# Patient Record
Sex: FEMALE | Race: OTHER
[De-identification: names, ages, dates, MRNs, and addresses within clinical notes are randomized per-mention and may not be internally consistent; named-entity substitution may affect disease eponyms.]

---

## 2017-12-20 ENCOUNTER — HOSPITAL ENCOUNTER (OUTPATIENT)
Dept: HOSPITAL 74 - FMAMMOTONE | Age: 67
Discharge: HOME | End: 2017-12-20
Attending: SURGERY
Payer: COMMERCIAL

## 2017-12-20 DIAGNOSIS — D05.12: Primary | ICD-10-CM

## 2017-12-20 DIAGNOSIS — N64.89: ICD-10-CM

## 2017-12-20 DIAGNOSIS — N64.1: ICD-10-CM

## 2017-12-20 PROCEDURE — 0HBU3ZX EXCISION OF LEFT BREAST, PERCUTANEOUS APPROACH, DIAGNOSTIC: ICD-10-PCS | Performed by: SURGERY

## 2017-12-21 NOTE — PATH
Surgical Pathology Report



Patient Name:  SIGIFREDO VOSS

Accession #:  [id number]

Med. Rec. #:  [id number]                                                        

   /Age/Gender:  1950 (Age: 67) / F

Account:  [id number]                                                          

             Location: [facility]

Taken:  2017

Received:  2017

Reported:  2017

Physicians:  Milady Santoro M.D.

  



Specimen(s) Received

A: LEFT BREAST SPECIMEN WITH CALCIFICATIONS 

B: LEFT BREAST SPECIMEN WITHOUT CALCIFICATIONS 





Clinical History

Mammographic findings: Microcalcification, suspicious







Final Diagnosis

A. BREAST, LEFT, WITH CALCIFICATIONS, STEREOTACTIC BIOPSY: 

DUCTAL CARCINOMA IN SITU (DCIS), SOLID AND CRIBRIFORM TYPE, INTERMEDIATE NUCLEAR

GRADE WITH MODERATE NECROSIS AND ASSOCIATED CALCIFICATIONS.



B. BREAST, LEFT, WITHOUT CALCIFICATIONS, STITCH AT BIOPSY:

FOCAL DCIS, INTERMEDIATE NUCLEAR GRADE WITH ASSOCIATED NECROSIS.



Results of ER and HI studies will be reported separately in addendum. 





***Electronically Signed***

Ana Downing M.D.



Addendum     

Reported: 2017



Addendum Diagnosis

Results of ER and HI studies performed on block A at [facility] are as follows:

ER (clone [id number] mouse monoclonal antibody by [employer]): 100 % nuclear staining with

strong intensity (Positive).

HI (clone16 mouse monoclonal antibody by [employer]): ~20 % nuclear staining with

moderate to weak intensity (Positive).



Positive and negative controls (internal if applicable) show appropriate

results.

Formalin fixation and cold ischemic times are within current ASCO/CAP

recommendations for ER, HI and Her2 testing.





 Ana Downing M.D.  

 



Gross Description

A. Received in formalin labeled "left breast with calcification," are 4

tan-yellow, cylindrical portions of fibroadipose tissue ranging from 2.1-2.5 cm

in length and averaging 0.3 cm in diameter. The specimens are submitted in toto

in one cassette.



B. Received in formalin labeled "left breast without calcifications," are 4

tan-yellow, cylindrical portions of fibroadipose tissue ranging from 1.2-2.2 cm

in length and averaging 0.2 cm in diameter. The specimens are submitted in toto

in one cassette.

Time to formalin fixation: 5 minutes

Total formalin fixation time: approximately 8 hours.

## 2017-12-21 NOTE — SPEC
DATE OF OPERATION:  12/20/2017 

 

PREOPERATIVE DIAGNOSIS:  Abnormal left mammography.

 

POSTOPERATIVE DIAGNOSIS:  Abnormal left mammography.

 

PROCEDURE PERFORMED:  Left breast stereotactic needle biopsy with clip.

 

SURGEON:  Milady Xiao MD

 

ANESTHESIA:  Local.

 

COMPLICATIONS:  None.

 

DISPOSITION:  Stable at the end of the procedure. 

 

INDICATIONS FOR PROCEDURE:  The patient underwent a routine screening mammography

that noted a cluster of microcalcifications in the left subareolar breast.  My

recommendation was for a needle biopsy.  The procedure was discussed with her and all

of her questions answered. 

 

DESCRIPTION OF PROCEDURE:  The patient was brought to [facility]

at [place] and laid prone on the OR table.  Using a lateral approach, the

calcifications in the subareolar left breast were identified.  A sterile prep was

obtained.  A target was chosen.  There was a positive stroke margin.  Using Betadine

and 1% lidocaine, a [employer] device was used to take several cores from this area. 

Specimen radiograph showed the cores with calcifications.  A clip was applied on the

area.  Hemostasis was assured with direct pressure.  Steri-Strips were used to close

the incision. 

 

She tolerated the procedure well and left the breast imaging center in good

condition. 

 

 

MILADY XIAO M.D.

 

[id number]

DD: 12/20/2017 10:13

DT: 12/21/2017 10:26

Job #:  [id number]

## 2018-02-05 ENCOUNTER — HOSPITAL ENCOUNTER (OUTPATIENT)
Dept: HOSPITAL 74 - JASUSAT | Age: 68
Discharge: HOME | End: 2018-02-05
Attending: SURGERY
Payer: COMMERCIAL

## 2018-02-05 VITALS — DIASTOLIC BLOOD PRESSURE: 70 MMHG | HEART RATE: 71 BPM | SYSTOLIC BLOOD PRESSURE: 122 MMHG

## 2018-02-05 VITALS — TEMPERATURE: 97.8 F

## 2018-02-05 VITALS — BODY MASS INDEX: 24.7 KG/M2

## 2018-02-05 DIAGNOSIS — D05.12: Primary | ICD-10-CM

## 2018-02-05 PROCEDURE — 0HBU0ZZ EXCISION OF LEFT BREAST, OPEN APPROACH: ICD-10-PCS | Performed by: SURGERY

## 2018-02-05 NOTE — OP
DATE OF OPERATION:  02/05/2018

 

PREOPERATIVE DIAGNOSIS:  Left breast ductal carcinoma in situ.

 

POSTOPERATIVE DIAGNOSIS:  Left breast ductal carcinoma in situ.

 

PROCEDURE:  Left breast wide localized lumpectomy.

 

SURGEON:  Milady Santoro MD

 

ANESTHESIA:  Local and IV sedation.

 

ESTIMATED BLOOD LOSS:  Minimal.

 

COMPLICATIONS:  None.

 

This was a sterile procedure.

 

INDICATION FOR PROCEDURE:  Patient had a needle biopsy that revealed DCIS.  My

recommendation was a lumpectomy.  The procedure was discussed and all of her

questions answered.

 

PROCEDURE IN DETAIL:  Patient was brought to [facility] in [place]

and taken down to Breast Imaging where a wire was used to localize a clip in the

upper, slightly retroareolar left breast.

 

She was then brought into the operating room, and after IV sedation and IV

antibiotics, the left breast was prepped and draped in usual sterile fashion.  The

area in the upper and retroareolar left breast was anesthetized with 1% lidocaine.  A

periareolar incision was made in the upper part of the left breast, and a wire was

used as a guide to get down to the area of interest.  This was excised en bloc,

tagged with a long stitch lateral, short stitch superior.  I felt that I was close

anteriorly which was the tissue behind the nipple.  Therefore, I took a new anterior

margin with a stitch at the old margin.  This was sent to Pathology for permanent

section.  Hemostasis was assured with electrocautery.  The parenchyma approximated

with interrupted 2-0 Vicryl.  Skin approximated with interrupted 3-0 Vicryl and

running 4-0 Prolene.  A sterile dressing with Tegaderm and 4 x 4's applied.  Specimen

radiograph showed the clip and wire to be intact within the specimen.  This was sent

to Pathology for permanent section.

 

 

BRUCE [id number]

DD: 02/05/2018 12:35

DT: 02/05/2018 21:41

Job #:  [id number]

## 2018-02-07 ENCOUNTER — HOSPITAL ENCOUNTER (EMERGENCY)
Dept: HOSPITAL 74 - JER | Age: 68
Discharge: HOME | End: 2018-02-07
Payer: COMMERCIAL

## 2018-02-07 VITALS — DIASTOLIC BLOOD PRESSURE: 85 MMHG | HEART RATE: 73 BPM | TEMPERATURE: 98.3 F | SYSTOLIC BLOOD PRESSURE: 155 MMHG

## 2018-02-07 VITALS — BODY MASS INDEX: 24.7 KG/M2

## 2018-02-07 DIAGNOSIS — G44.209: Primary | ICD-10-CM

## 2018-02-07 DIAGNOSIS — K21.9: ICD-10-CM

## 2018-02-07 DIAGNOSIS — R11.0: ICD-10-CM

## 2018-02-07 DIAGNOSIS — Z98.890: ICD-10-CM

## 2018-02-07 DIAGNOSIS — Z85.3: ICD-10-CM

## 2018-02-07 DIAGNOSIS — I10: ICD-10-CM

## 2018-02-07 LAB
ALBUMIN SERPL-MCNC: 4 G/DL (ref 3.4–5)
ALP SERPL-CCNC: 58 U/L (ref 45–117)
ALT SERPL-CCNC: 31 U/L (ref 12–78)
ANION GAP SERPL CALC-SCNC: 12 MMOL/L (ref 8–16)
APPEARANCE UR: CLEAR
AST SERPL-CCNC: 20 U/L (ref 15–37)
BASOPHILS # BLD: 0.3 % (ref 0–2)
BILIRUB SERPL-MCNC: 1 MG/DL (ref 0.2–1)
BILIRUB UR STRIP.AUTO-MCNC: NEGATIVE MG/DL
BUN SERPL-MCNC: 13 MG/DL (ref 7–18)
CALCIUM SERPL-MCNC: 8.9 MG/DL (ref 8.5–10.1)
CHLORIDE SERPL-SCNC: 105 MMOL/L (ref 98–107)
CO2 SERPL-SCNC: 22 MMOL/L (ref 21–32)
COLOR UR: (no result)
CREAT SERPL-MCNC: 0.9 MG/DL (ref 0.55–1.02)
DEPRECATED RDW RBC AUTO: 12.3 % (ref 11.6–15.6)
EOSINOPHIL # BLD: 0.2 % (ref 0–4.5)
GLUCOSE SERPL-MCNC: 101 MG/DL (ref 74–106)
HCT VFR BLD CALC: 39.8 % (ref 32.4–45.2)
HGB BLD-MCNC: 13.5 GM/DL (ref 10.7–15.3)
KETONES UR QL STRIP: (no result)
LEUKOCYTE ESTERASE UR QL STRIP.AUTO: NEGATIVE
LYMPHOCYTES # BLD: 15.3 % (ref 8–40)
MCH RBC QN AUTO: 29.9 PG (ref 25.7–33.7)
MCHC RBC AUTO-ENTMCNC: 34.1 G/DL (ref 32–36)
MCV RBC: 87.7 FL (ref 80–96)
MONOCYTES # BLD AUTO: 5 % (ref 3.8–10.2)
NEUTROPHILS # BLD: 79.2 % (ref 42.8–82.8)
NITRITE UR QL STRIP: NEGATIVE
PH UR: 6 [PH] (ref 5–8)
PLATELET # BLD AUTO: 160 K/MM3 (ref 134–434)
PMV BLD: 10.3 FL (ref 7.5–11.1)
POTASSIUM SERPLBLD-SCNC: 3.8 MMOL/L (ref 3.5–5.1)
PROT SERPL-MCNC: 7.8 G/DL (ref 6.4–8.2)
PROT UR QL STRIP: NEGATIVE
PROT UR QL STRIP: NEGATIVE
RBC # BLD AUTO: 4.54 M/MM3 (ref 3.6–5.2)
RBC # UR STRIP: NEGATIVE /UL
SODIUM SERPL-SCNC: 139 MMOL/L (ref 136–145)
SP GR UR: 1.01 (ref 1–1.03)
UROBILINOGEN UR STRIP-MCNC: NEGATIVE MG/DL (ref 0.2–1)
WBC # BLD AUTO: 9.5 K/MM3 (ref 4–10)

## 2018-02-07 PROCEDURE — 3E033GC INTRODUCTION OF OTHER THERAPEUTIC SUBSTANCE INTO PERIPHERAL VEIN, PERCUTANEOUS APPROACH: ICD-10-PCS

## 2018-02-07 PROCEDURE — 3E0337Z INTRODUCTION OF ELECTROLYTIC AND WATER BALANCE SUBSTANCE INTO PERIPHERAL VEIN, PERCUTANEOUS APPROACH: ICD-10-PCS

## 2018-02-07 NOTE — PDOC
History of Present Illness





- General


Chief Complaint: Weakness


Stated Complaint: (POST-OP) NAUSEA, HEADACHES


Time Seen by Provider: 02/07/18 15:09





- History of Present Illness


Initial Comments: 





02/07/18 22:07


67 year old female c/o nausea, headache and dizziness s/p left breast 

lumpectomy 2 days ago. reports that she is unable to eat or drink since surgery 

due to extreme nausea. patient has a past medical history of HTN, vertigo and 

migraine headaches. denies chest pain, abdominal pain. Diaphoresis, headache is 

similar to previous migraine headache. 





Past History





- Past Medical History


Allergies/Adverse Reactions: 


 Allergies











Allergy/AdvReac Type Severity Reaction Status Date / Time


 


No Known Drug Allergies Allergy   Verified 02/07/18 15:12











Home Medications: 


Ambulatory Orders





Esomeprazole Magnesium [Nexium 24Hr] 20 mg PO DAILY 02/02/18 


Valsartan [Diovan] 80 mg PO DAILY 02/02/18 


Acetaminophen W/ Codeine #3 [Tylenol # 3 -] 1 tab PO Q6H PRN #14 tablet MDD 

pills 02/05/18 








COPD: No


GI Disorders: Yes (GERD)


HTN: Yes





- Suicide/Smoking/Psychosocial Hx


Smoking History: Never smoked


Have you smoked in the past 12 months: No


Information on smoking cessation initiated: No


Hx Alcohol Use: No


Drug/Substance Use Hx: No


Substance Use Type: None


Hx Substance Use Treatment: No





**Review of Systems





- Review of Systems


Able to Perform ROS?: Yes


Is the patient limited English proficient: No


Constitutional: No: Symptoms Reported, See HPI, Chills, Diaphoresis, Fever, 

Loss of Appetite, Malaise, Night Sweats, Weakness, Weight Stable, Unintentional 

Wgt. Loss, Unexplained wgt Loss, Other


ABD/GI: Yes: Nausea, Vomiting.  No: Symptoms Reported, See HPI, Abdominal 

Distended, Abd. Pain w/ defecation, Blood Streaked Bowels, Constipated, Diarrhea

, Difficulty Swallowing, Poor Appetite, Poor Fluid Intake, Rectal Bleeding, 

Indigestion, Abdominal cramping, Tarry Stools, Other


Neurological: Yes: Headache, Dizziness.  No: Symptoms reported, See HPI, 

Numbness, Paresthesia, Pre-Existing Deficit, Seizure, Tingling, Tremors, 

Weakness, Unsteady Gait, Ataxia, Other





*Physical Exam





- Vital Signs


 Last Vital Signs











Temp Pulse Resp BP Pulse Ox


 


 98.3 F   73   18   155/85   100 


 


 02/07/18 15:10  02/07/18 15:10  02/07/18 15:10  02/07/18 15:10  02/07/18 15:10














- Physical Exam


General Appearance: Yes: Appropriately Dressed


Respiratory/Chest: positive: Lungs Clear, Normal Breath Sounds


Extremity: positive: Normal Capillary Refill, Normal Inspection


Neurologic: positive: CNs II-XII NML intact, Fully Oriented, Alert, Normal Mood/

Affect, Normal Response, Motor Strength 5/5, Other (+ shayna mcpherson pike)





ED Treatment Course





- LABORATORY


CBC & Chemistry Diagram: 


 02/07/18 15:25





 02/07/18 15:25





- ADDITIONAL ORDERS


Additional order review: 


 Laboratory  Results











  02/07/18





  15:25


 


Sodium  139


 


Potassium  3.8


 


Chloride  105


 


Carbon Dioxide  22


 


Anion Gap  12


 


BUN  13


 


Creatinine  0.9


 


Creat Clearance w eGFR  > 60


 


Random Glucose  101


 


Calcium  8.9


 


Total Bilirubin  1.0


 


AST  20


 


ALT  31


 


Alkaline Phosphatase  58


 


Total Protein  7.8


 


Albumin  4.0








 











  02/07/18





  15:25


 


RBC  4.54


 


MCV  87.7


 


MCHC  34.1


 


RDW  12.3


 


MPV  10.3


 


Neutrophils %  79.2  D


 


Lymphocytes %  15.3  D


 


Monocytes %  5.0


 


Eosinophils %  0.2  D


 


Basophils %  0.3














- Medications


Given in the ED: 


ED Medications














Discontinued Medications














Generic Name Dose Route Start Last Admin





  Trade Name Jennifer  PRN Reason Stop Dose Admin


 


Ondansetron HCl  4 mg  02/07/18 15:14  02/07/18 15:29





  Zofran Odt -  SL  02/07/18 15:15  4 mg





  ONCE ONE   Administration














*DC/Admit/Observation/Transfer


Diagnosis at time of Disposition: 


 Vertigo





Headache


Qualifiers:


 Headache type: tension-type Headache chronicity pattern: acute headache 

Intractability: not intractable Qualified Code(s): G44.209 - Tension-type 

headache, unspecified, not intractable








- Discharge Dispostion


Disposition: HOME





- Referrals


Referrals: 


Alberto Gordon [Primary Care Provider] - Call tomorrow





- Patient Instructions


Printed Discharge Instructions:  Migraine Headaches (Alternative Therapy)


Additional Instructions: 


drink plenty of fluids. 








take tylenol/ ibuprofen every 6 hours as needed for pain





follow up with your doctor as soon as possible. 





- Post Discharge Activity

## 2018-02-07 NOTE — PDOC
Rapid Medical Evaluation


Chief Complaint: Weakness


Time Seen by Provider: 02/07/18 15:09


Medical Evaluation: 


 Allergies











Allergy/AdvReac Type Severity Reaction Status Date / Time


 


No Known Drug Allergies Allergy   Verified 02/05/18 08:43











02/07/18 15:10


I have performed a brief in-person evaluation of this patient. 


The patient presents with a chief complaint of: hx of breast cancer, lumpectomy 

Monday, c/o nausea, dizziness, headache, since yesterday, no vomiting, diarrhea

, cough, runny nose, no sick contacts, denies fever, took tylenol at 9;30 am, 

has hx of migraines, this is "worst headache of her life" , denies chest pain, 

SOB


Pertinent physical exam findings: uncomfortable appearing


I have ordered the following:  labs, flu swab


The patient will proceed to the ED for further evaluation.





**Discharge Disposition





- Diagnosis


 Headache








- Referrals





- Patient Instructions





- Post Discharge Activity

## 2018-02-07 NOTE — PDOC
*Physical Exam





- Vital Signs


 Last Vital Signs











Temp Pulse Resp BP Pulse Ox


 


 98.3 F   73   18   155/85   100 


 


 02/07/18 15:10  02/07/18 15:10  02/07/18 15:10  02/07/18 15:10  02/07/18 15:10














ED Treatment Course





- LABORATORY


CBC & Chemistry Diagram: 


 02/07/18 15:25





 02/07/18 15:25





- ADDITIONAL ORDERS


Additional order review: 


 Laboratory  Results











  02/07/18 02/07/18





  20:15 15:25


 


Sodium   139


 


Potassium   3.8


 


Chloride   105


 


Carbon Dioxide   22


 


Anion Gap   12


 


BUN   13


 


Creatinine   0.9


 


Creat Clearance w eGFR   > 60


 


Random Glucose   101


 


Calcium   8.9


 


Total Bilirubin   1.0


 


AST   20


 


ALT   31


 


Alkaline Phosphatase   58


 


Total Protein   7.8


 


Albumin   4.0


 


Urine Color  Straw 


 


Urine Appearance  Clear 


 


Urine pH  6.0 


 


Ur Specific Gravity  1.006 


 


Urine Protein  Negative 


 


Urine Glucose (UA)  Negative 


 


Urine Ketones  1+ H 


 


Urine Blood  Negative 


 


Urine Nitrite  Negative 


 


Urine Bilirubin  Negative 


 


Urine Urobilinogen  Negative 


 


Ur Leukocyte Esterase  Negative 








 











  02/07/18





  15:25


 


RBC  4.54


 


MCV  87.7


 


MCHC  34.1


 


RDW  12.3


 


MPV  10.3


 


Neutrophils %  79.2  D


 


Lymphocytes %  15.3  D


 


Monocytes %  5.0


 


Eosinophils %  0.2  D


 


Basophils %  0.3














- Medications


Given in the ED: 


ED Medications














Discontinued Medications














Generic Name Dose Route Start Last Admin





  Trade Name Freq  PRN Reason Stop Dose Admin


 


Ondansetron HCl  4 mg  02/07/18 15:14  02/07/18 15:29





  Zofran Odt -  SL  02/07/18 15:15  4 mg





  ONCE ONE   Administration














Medical Decision Making





- Medical Decision Making





02/07/18 20:42


agree with care from HARSHAL Moyer





*DC/Admit/Observation/Transfer


Diagnosis at time of Disposition: 


 Headache








- Referrals


Referrals: 


Alberto Gordon [Primary Care Provider] - 





- Patient Instructions





- Post Discharge Activity

## 2018-02-09 NOTE — PATH
Surgical Pathology Report



Patient Name:  SIGIFREDO VOSS

Accession #:  

Med. Rec. #:  [id number]                                                        

   /Age/Gender:  1950 (Age: 67) / F

Account:  [id number]                                                          

             Location: AMBULATORY SURG

Taken:  2018

Received:  2018

Reported:  2018

Physicians:  Milady Santoro M.D.

  



Specimen(s) Received

A: LEFT BREAST 

B: LEFT BREAST NEW ANTERIOR MARGIN 





Clinical History

DCIS







Final Diagnosis

A. BREAST, LEFT, LUMPECTOMY:

INVASIVE DUCTAL CARCINOMA, MODERATELY DIFFERENTIATED (TUBULE SCORE: 3/3, NUCLEAR

GRADE: 2/3, MITOTIC SCORE: 1/3; TOTAL SHARMIN SCORE: 6/9).

INVASIVE CARCINOMA MEASURES 6 MM IN GREATEST DIMENSION, MICROSCOPICALLY.

EXTENSIVE DUCTAL CARCINOMA IN SITU (DCIS), SOLID, CRIBRIFORM, AND PAPILLARY

TYPES, INTERMEDIATE TO HIGH NUCLEAR GRADE WITH MODERATE NECROSIS AND ASSOCIATED

CALCIFICATIONS.

NO LYMPHOVASCULAR INVASION IDENTIFIED.

SURGICAL MARGINS ARE UNINVOLVED BY CARCINOMA; INVASIVE CARCINOMA IS AT 2 MM AND

DCIS AT 3 MM FROM THE CLOSEST LATERAL MARGIN.

REMAINDER OF THE BREAST TISSUE SHOWS ATYPICAL AND USUAL DUCTAL HYPERPLASIA IN A

BACKGROUND OF PROLIFERATIVE FIBROCYSTIC CHANGES INCLUDING STROMAL FIBROSIS,

MICROCYST FORMATION, COLUMNAR CELL CHANGES, AND SCLEROSING ADENOSIS.

PRIOR BIOPSY SITE CHANGES ARE PRESENT.

PATHOLOGIC STAGE (PTNM): PT1b PNx.

SEE ALSO INVASIVE CARCINOMA CASE SUMMARY BELOW.



B. BREAST, LEFT, NEW ANTERIOR MARGIN, EXCISION:

FOCAL ATYPICAL DUCTAL HYPERPLASIA IN A BACKGROUND OF FIBROCYSTIC CHANGES

INCLUDING STROMAL FIBROSIS AND MICROCYST FORMATION.



Comment: Immunohistochemical stains performed and interpreted at [facility] show the invasive carcinoma is positive for CK7, while

negative for p63 and smooth muscle myosin heavy chain (SMM-HC).



Comments

Breast Invasive Carcinoma: Surgical Pathology Case Summary

(Based on AJCC TNM 8 th edition) 



Procedure 

_X_ Excision (less than total mastectomy)



Specimen Laterality

_X_ Left



Tumor Size 

_X_ Greatest dimension of largest invasive focus >1 mm (specify exact

measurement) (millimeters): 6 mm      



Histologic Type

_X_ Invasive carcinoma of no special type (ductal, not otherwise specified)



Histologic Grade (Sharmin Histologic Score) 



Glandular (Acinar)/Tubular Differentiation

_X_ Score 3 (<10% of tumor area forming glandular/tubular structures)



Nuclear Pleomorphism

     _X_ Score 2 



Mitotic Rate

_X_ Score 1 



Overall Grade

_X_ Grade 2 (scores of 6) 





Tumor Focality 

 _X_ Single focus of invasive carcinoma



Ductal Carcinoma In Situ (DCIS) 

_X_ DCIS is present in specimen

       _X_ Positive for EIC 

      





Margins 

Invasive Carcinoma Margins 

_X_ Uninvolved by invasive carcinoma

     Distance from closest margin (millimeters): 3 mm 

     Closest margin: Lateral



DCIS Margins 

_X_ Uninvolved by DCIS

     Distance from closest margin (millimeters): 2 mm

     Closest margin: Lateral



Regional Lymph Nodes 

_X_ No lymph nodes submitted or found



Treatment Effect 

_X_ No known presurgical therapy





 Lymphovascular Invasion 

 _X_ Not identified



Pathologic Stage Classification (pTNM, AJCC 8th Edition) 



Primary Tumor (Invasive Carcinoma) (pT) 

     _X_ pT1b:     Tumor >5 mm but =10 mm in greatest dimension



Regional Lymph Nodes (pN)



Category (pN)

     _X_ pNX:     Regional lymph nodes cannot be assessed 

     

Biomarker Studies 

Pending and will be reported as an addendum. 



***Electronically Signed***

Diana Zacarias M.D.



Addendum     

Reported: 2018



Addendum Diagnosis

Results of Estrogen Receptor (ER) and Progesterone Receptor (NH) studies

performed on block "A3" at [facility] are as follows:

ER (clone [id number] mouse monoclonal antibody by [employer]): 99 % nuclear staining with

strong intensity (Positive).

NH (clone16 mouse monoclonal antibody by [employer]): 30% nuclear staining with

moderate to strong intensity (Positive).



Results of Her2 (IHC) & Ki-67 studies performed on block "A3" at [place], [place] ([id number]) are as follows: 

Her2 IHC (EP3 from [employer], formerly known as [id number], using Bond Polymer Refine

detection kit): 1+ (Negative)

Ki-67: ~5% (Low proliferative index)



Positive and negative controls (internal if applicable) show appropriate

results.

Formalin fixation and cold ischemic times are within current ASCO/CAP

recommendations for ER, NH and Her2 testing. 





 Diana Zacarias M.D.  

 



Gross Description

A. Received fresh [employer], labeled with the patient's name and indicated on the

requisition to be left breast lumpectomy, is a 5.6 x 5.3 x 1.6 cm. tan-yellow,

irregular, portion of fibroadipose tissue with a needle localization wire

present. There is a short suture marking the superior aspect and a long suture

marking the lateral aspect, per the surgeon. There is no skin or nipple present.

The specimen is inked as follows: superior and lateral blue; inferior green;

medial yellow; anterior red; deep black. The specimen is serially sectioned from

superior to inferior. Sectioning reveals abundant dense, white, focally firm

fibrous tissue. No definitive mass is identified. A grey metallic biopsy clip is

identified. Representative sections are submitted in 13 cassettes as follows:

1-2-one full thickness bisected section from area biopsy clip (1-anterior, deep

and lateral margins; 2-anterior, deep and medial margins); 3-additional fibrous

tissue surrounding biopsy clip with anterior, deep and lateral margins;

[id number] fibrous tissue sequentially submitted from superior to inferior

(one bisected section each in cassettes 4/5, 6/7, 8/9, 10/11); 12-superior

margin; 13-inferior margin.



Total formalin fixation time: Approximately 10 hours



B. Received in formalin labeled "breast new anterior margin," is a 3.1 x 2.7 x

0.6 cm irregular portion of fibroadipose tissue with a suture marking the biopsy

cavity side, per the surgeon. The new margin is inked blue and the specimen is

serially sectioned. The specimen is entirely and sequentially submitted in 4

cassettes.





[facility]

## 2020-02-24 ENCOUNTER — HOSPITAL ENCOUNTER (EMERGENCY)
Dept: HOSPITAL 74 - JER | Age: 70
LOS: 1 days | Discharge: HOME | End: 2020-02-25
Payer: COMMERCIAL

## 2020-02-24 VITALS — HEART RATE: 80 BPM

## 2020-02-24 VITALS — BODY MASS INDEX: 24.7 KG/M2

## 2020-02-24 VITALS — SYSTOLIC BLOOD PRESSURE: 133 MMHG | TEMPERATURE: 97.6 F | DIASTOLIC BLOOD PRESSURE: 85 MMHG

## 2020-02-24 DIAGNOSIS — E03.9: ICD-10-CM

## 2020-02-24 DIAGNOSIS — F32.9: ICD-10-CM

## 2020-02-24 DIAGNOSIS — R19.7: ICD-10-CM

## 2020-02-24 DIAGNOSIS — R73.03: ICD-10-CM

## 2020-02-24 DIAGNOSIS — I10: ICD-10-CM

## 2020-02-24 DIAGNOSIS — R42: Primary | ICD-10-CM

## 2020-02-24 DIAGNOSIS — R11.2: ICD-10-CM

## 2020-02-24 LAB
ALBUMIN SERPL-MCNC: 4.6 G/DL (ref 3.4–5)
ALP SERPL-CCNC: 59 U/L (ref 45–117)
ALT SERPL-CCNC: 44 U/L (ref 13–61)
ANION GAP SERPL CALC-SCNC: 11 MMOL/L (ref 8–16)
AST SERPL-CCNC: 27 U/L (ref 15–37)
BASOPHILS # BLD: 0.4 % (ref 0–2)
BILIRUB SERPL-MCNC: 1 MG/DL (ref 0.2–1)
BUN SERPL-MCNC: 18.8 MG/DL (ref 7–18)
CALCIUM SERPL-MCNC: 10 MG/DL (ref 8.5–10.1)
CHLORIDE SERPL-SCNC: 103 MMOL/L (ref 98–107)
CO2 SERPL-SCNC: 25 MMOL/L (ref 21–32)
CREAT SERPL-MCNC: 1.3 MG/DL (ref 0.55–1.3)
DEPRECATED RDW RBC AUTO: 13 % (ref 11.6–15.6)
EOSINOPHIL # BLD: 0 % (ref 0–4.5)
GLUCOSE SERPL-MCNC: 115 MG/DL (ref 74–106)
HCT VFR BLD CALC: 42.7 % (ref 32.4–45.2)
HGB BLD-MCNC: 14.5 GM/DL (ref 10.7–15.3)
LYMPHOCYTES # BLD: 12.4 % (ref 8–40)
MCH RBC QN AUTO: 29.4 PG (ref 25.7–33.7)
MCHC RBC AUTO-ENTMCNC: 34 G/DL (ref 32–36)
MCV RBC: 86.6 FL (ref 80–96)
MONOCYTES # BLD AUTO: 2.6 % (ref 3.8–10.2)
NEUTROPHILS # BLD: 84.6 % (ref 42.8–82.8)
PLATELET # BLD AUTO: 186 K/MM3 (ref 134–434)
PMV BLD: 10.7 FL (ref 7.5–11.1)
POTASSIUM SERPLBLD-SCNC: 3.8 MMOL/L (ref 3.5–5.1)
PROT SERPL-MCNC: 9.1 G/DL (ref 6.4–8.2)
RBC # BLD AUTO: 4.93 M/MM3 (ref 3.6–5.2)
SODIUM SERPL-SCNC: 139 MMOL/L (ref 136–145)
WBC # BLD AUTO: 11.9 K/MM3 (ref 4–10)

## 2020-02-24 PROCEDURE — 3E0337Z INTRODUCTION OF ELECTROLYTIC AND WATER BALANCE SUBSTANCE INTO PERIPHERAL VEIN, PERCUTANEOUS APPROACH: ICD-10-PCS

## 2020-02-24 PROCEDURE — 3E033GC INTRODUCTION OF OTHER THERAPEUTIC SUBSTANCE INTO PERIPHERAL VEIN, PERCUTANEOUS APPROACH: ICD-10-PCS

## 2020-02-24 NOTE — PDOC
History of Present Illness





- General


Chief Complaint: Lightheaded


Stated Complaint: VOMITING


Time Seen by Provider: 02/24/20 19:09





- History of Present Illness


Initial Comments: 








Kori Lizama is a 70 y/o female with PMH significant for hypothyroid, 

depression, htn, pre-DM, vertigo, presenting today with nausea, vomiting x3-4, 

diarrhea, lightheadedness (no vertigo) that started 5 hours ago while she was 

cooking. Denies fever. Denies abdominal pain. Denies sick cnotacts. She had a 

similar episode in 2018. She took all of her medications today. No change in 

diet. She last vomited at 6pm and has been unable to tolerate PO or fluids. She 

tried taking her milantin and meclizine and vomited both. 














Past History





- Past Medical History


Allergies/Adverse Reactions: 


 Allergies











Allergy/AdvReac Type Severity Reaction Status Date / Time


 


No Known Drug Allergies Allergy   Verified 02/24/20 19:11











Home Medications: 


Ambulatory Orders





Esomeprazole Magnesium [Nexium 24Hr] 20 mg PO DAILY 02/08/18 


Fluticasone Prop 0.05% Nasal [Flonase -] 1 spray NS DAILY 02/08/18 


Gabapentin 600 mg PO DAILY 02/08/18 


Hydrochlorothiazide [Hctz -] 12.5 mg PO DAILY 02/08/18 


Valsartan [Diovan] 80 mg PO DAILY 02/08/18 








COPD: No


GI Disorders: Yes (GERD)


HTN: Yes





- Immunization History


Immunization Up to Date: Yes





- Psycho Social/Smoking Cessation Hx


Smoking History: Never smoked


Have you smoked in the past 12 months: No


Hx Alcohol Use: No


Drug/Substance Use Hx: No


Substance Use Type: None


Hx Substance Use Treatment: No





**Review of Systems





- Review of Systems


Comments:: 





GENERAL/CONSTITUTIONAL: No fever or chills. No weakness._


HEAD, EYES, EARS, NOSE AND THROAT: No change in vision. No change in hearing. 

No sore throat._


CARDIOVASCULAR: No chest pain or shortness of breath_


RESPIRATORY: Denies cough, hemoptysis_


GASTROINTESTINAL: Reports nausea, vomiting, diarrhea. 


GENITOURINARY: No dysuria, frequency, or change in urination._


MUSCULOSKELETAL: No joint or muscle swelling or pain. No neck or back pain._


SKIN: No rash_


NEUROLOGIC: Reports lightheadedness. No headache, vertigo, loss of consciousness

, or change in strength/sensation._


ENDOCRINE: No increased thirst. No abnormal weight change_


HEMATOLOGIC/LYMPHATIC: No anemia, easy bleeding, or history of blood clots._


ALLERGIC/IMMUNOLOGIC: No hives or skin allergy._














*Physical Exam





- Vital Signs


 Last Vital Signs











Temp Pulse Resp BP Pulse Ox


 


 97.3 F L  80   18   142/89   100 


 


 02/24/20 19:09  02/24/20 19:09  02/24/20 19:09  02/24/20 19:09  02/24/20 19:09














- Physical Exam








GENERAL: Awake, alert, and oriented to person/place/time, in no acute distress_


HEAD: No signs of trauma, normocephalic, atraumatic _


EYES: PERRLA, EOMI, sclera anicteric, conjunctiva clear_


ENT: Hearing grossly normal, nares patent, oropharynx clear without exudates. 

No uvular deviation. Dry mucous membranes. 


NECK: Normal ROM, supple, no lymphadenopathy, JVD, or masses_


LUNGS: No distress, speaks in full sentences, clear to auscultation bilaterally 

_


HEART: Regular rate and rhythm, normal S1 and S2, no murmurs appreciated, 

peripheral pulses normal and equal bilaterally._


ABDOMEN: Soft, nontender, normoactive bowel sounds. No guarding, no rebound. No 

masses_


EXTREMITIES: Normal inspection, Normal range of motion, no edema. No clubbing 

or cyanosis_


NEUROLOGICAL: Cranial nerves II through XII grossly intact. Normal speech, no 

focal sensorimotor deficits _


SKIN: Warm, Dry, normal turgor, no rashes or lesions noted_











ED Treatment Course





- LABORATORY


CBC & Chemistry Diagram: 


 02/24/20 22:20





 02/24/20 22:20





Medical Decision Making





- Medical Decision Making





02/24/20 21:39


69F hx of hypothyroid depressino, htn, pre DM, vertigo, presenting with nausea, 

vomiting, and diarrhea that started today.


-cbc, cmp


-ekg, trop, cxr


-ua, ucx


-fluids, zofran  





02/24/20 23:51


EKG shows NSR, 85 bpm, no ST elevation, QTc 454, no axis deviation. 





02/24/20 23:54


CXR shows no acute intra thoracic pathology. 


Pt reassessed. Reports feeling unsteady on her feet while walking. Negative for 

ataxic gait. Will obtain CT head. 








02/25/20 00:16


Labs reviewed.


 Laboratory Last Values











WBC  11.9 K/mm3 (4.0-10.0)  H  02/24/20  22:20    


 


RBC  4.93 M/mm3 (3.60-5.2)   02/24/20  22:20    


 


Hgb  14.5 GM/dL (10.7-15.3)   02/24/20  22:20    


 


Hct  42.7 % (32.4-45.2)   02/24/20  22:20    


 


MCV  86.6 fl (80-96)   02/24/20  22:20    


 


MCH  29.4 pg (25.7-33.7)   02/24/20  22:20    


 


MCHC  34.0 g/dl (32.0-36.0)   02/24/20  22:20    


 


RDW  13.0 % (11.6-15.6)   02/24/20  22:20    


 


Plt Count  186 K/MM3 (134-434)   02/24/20  22:20    


 


MPV  10.7 fl (7.5-11.1)   02/24/20  22:20    


 


Absolute Neuts (auto)  10.1 K/mm3 (1.5-8.0)  H  02/24/20  22:20    


 


Neutrophils %  84.6 % (42.8-82.8)  H  02/24/20  22:20    


 


Lymphocytes %  12.4 % (8-40)   02/24/20  22:20    


 


Monocytes %  2.6 % (3.8-10.2)  L  02/24/20  22:20    


 


Eosinophils %  0.0 % (0-4.5)  D 02/24/20  22:20    


 


Basophils %  0.4 % (0-2.0)   02/24/20  22:20    


 


Nucleated RBC %  0 % (0-0)   02/24/20  22:20    


 


Sodium  139 mmol/L (136-145)   02/24/20  22:20    


 


Potassium  3.8 mmol/L (3.5-5.1)   02/24/20  22:20    


 


Chloride  103 mmol/L ()   02/24/20  22:20    


 


Carbon Dioxide  25 mmol/L (21-32)   02/24/20  22:20    


 


Anion Gap  11 MMOL/L (8-16)   02/24/20  22:20    


 


BUN  18.8 mg/dL (7-18)  H  02/24/20  22:20    


 


Creatinine  1.3 mg/dL (0.55-1.3)   02/24/20  22:20    


 


Est GFR (CKD-EPI)AfAm  48.82   02/24/20  22:20    


 


Est GFR (CKD-EPI)NonAf  42.12   02/24/20  22:20    


 


Random Glucose  115 mg/dL ()  H  02/24/20  22:20    


 


Calcium  10.0 mg/dL (8.5-10.1)   02/24/20  22:20    


 


Total Bilirubin  1.0 mg/dL (0.2-1)   02/24/20  22:20    


 


AST  27 U/L (15-37)   02/24/20  22:20    


 


ALT  44 U/L (13-61)   02/24/20  22:20    


 


Alkaline Phosphatase  59 U/L ()   02/24/20  22:20    


 


Creatine Kinase  135 U/L ()   02/24/20  22:20    


 


Troponin I  < 0.02 ng/ml (0.00-0.05)   02/24/20  22:20    


 


Total Protein  9.1 g/dl (6.4-8.2)  H  02/24/20  22:20    


 


Albumin  4.6 g/dl (3.4-5.0)   02/24/20  22:20    














02/25/20 01:40


CT head shows no acute intracranial pathology. Pt reports feeling better. Plan 

to d/c home with PO fluids and PCP and neuro f/u PRN. All questions answered. 

Return precautions given. Pt verbalized understanding and agreement with plan.  








Discharge





- Discharge Information


Problems reviewed: Yes


Clinical Impression/Diagnosis: 


 Nausea vomiting and diarrhea, Lightheaded





Condition: Stable


Disposition: HOME





- Admission


No





- Follow up/Referral


Referrals: 


Alberto Gordon [Primary Care Provider] - 


Joe Monahan MD [Staff Physician] - 





- Patient Discharge Instructions


Patient Printed Discharge Instructions:  DI for Vomiting -- Adult


Additional Instructions: 


Please keep yourself hydrated with fluids.





If your symptoms do not improve, please f/u with your primary care doctor and 

your neurologist. 





If you experience any new, worsening, or concerning symptoms, including severe 

headache, weakness, blood in the stool or vomit, or any other concerns, please 

return to the emergency department. 





- Post Discharge Activity

## 2020-02-24 NOTE — PDOC
Rapid Medical Evaluation


Medical Evaluation: 


 Allergies











Allergy/AdvReac Type Severity Reaction Status Date / Time


 


No Known Drug Allergies Allergy   Verified 02/07/18 15:12








I have performed a brief in-person evaluation of this patient.


The patient presents with a chief complaint of: C/O dizziness, N/V/D from today

; denies abd pain, fever; hx of vertigo, pre-DM, HTN; took Meclizine but states 

threw it up


Pertinent physical exam findings: In NAD, abdomen soft, ND


I have ordered the following:  Labs, EKG


The patient will proceed to the ED for further evaluation.








02/24/20 19:09

## 2020-02-24 NOTE — PDOC
Documentation entered by Ira Terry SCRIBE, acting as scribe for Carol Moyer MD.








Carol Moyer MD:  This documentation has been prepared by the cyndeeibe, 

Ira Terry SCRIBE, under my direction and personally reviewed by me in its 

entirety.  I confirm that the documentation accurately reflects all work, 

treatment, procedures, and medical decision making performed by me.  





Attending Attestation





- Resident


Resident Name: Anand Sanabria





- ED Attending Attestation


I have performed the following: I have examined & evaluated the patient, The 

case was reviewed & discussed with the resident, I agree w/resident's findings 

& plan, Exceptions are as noted





- HPI


HPI: 


02/24/20 21:53


The patient is a 61-year-old female with a past medical history significant for 

vertigo, hypothyroidism, depression, HTN, and pre-DM who presents to the 

emergency department with an acute onset of nausea, vomiting, diarrhea, and 

lightheadedness about 5 hours prior to arrival while she was cooking. The 

patient reports she took Mylanta and Meclizine for the symptoms reports she 

vomited the medication, following she had 3-4 episodes of vomiting. Denies 

abdominal pain or sick contact. 





- Physicial Exam


PE: 


02/25/20 00:44


GENERAL: 


Well-appearing, well-nourished. No apparent distress.


HEENT: 


Normocephalic, atraumatic. PERRL, EOM intact.


NECK: supple, no JVD. 


CARDIOVASCULAR: 


Regular rate and rhythm.


PULMONARY: 


Clear to auscultation bilaterally.


ABDOMEN: 


Soft, non-distended, non-tender. 


EXTREMITIES: No pitting edema. 


Normal ROM in all four extremities. No gross deformities.


SKIN: 


Warm, dry.  No rash


NEUROLOGICAL: Alert and oriented, conversant. Felt unsteady but no ataxia or 

dysmetria. No focal neurological deficits.





- Medical Decision Making





02/25/20 01:34


69-year-old female with nausea vomiting and diarrhea that started this afternoon


No fever chills no focal abdominal pain, no sick contacts


Past surgical history left breast lumpectomy


Patient received IV fluids and Zofran


02/25/20 02:19


CAT scan of the head is negative for any acute intracranial pathology


Symptoms resolved with IV fluids and Zofran

## 2020-02-25 LAB
APPEARANCE UR: CLEAR
BACTERIA # UR AUTO: 20.6 /HPF
BILIRUB UR STRIP.AUTO-MCNC: NEGATIVE MG/DL
CASTS URNS QL MICRO: 15 /LPF (ref 0–8)
COLOR UR: YELLOW
EPITH CASTS URNS QL MICRO: 0.3 /HPF
KETONES UR QL STRIP: (no result)
LEUKOCYTE ESTERASE UR QL STRIP.AUTO: (no result)
NITRITE UR QL STRIP: NEGATIVE
PH UR: >= 9 [PH] (ref 5–8)
PROT UR QL STRIP: (no result)
PROT UR QL STRIP: NEGATIVE
RBC # BLD AUTO: 3 /HPF (ref 0–4)
SP GR UR: 1.02 (ref 1.01–1.03)
UROBILINOGEN UR STRIP-MCNC: 1 MG/DL (ref 0.2–1)
WBC # UR AUTO: 62 /HPF (ref 0–5)

## 2020-02-25 NOTE — EKG
Test Reason : 

Blood Pressure : ***/*** mmHG

Vent. Rate : 085 BPM     Atrial Rate : 085 BPM

   P-R Int : 182 ms          QRS Dur : 076 ms

    QT Int : 382 ms       P-R-T Axes : 041 018 049 degrees

   QTc Int : 454 ms

 

*** POOR DATA QUALITY, INTERPRETATION MAY BE ADVERSELY AFFECTED

NORMAL SINUS RHYTHM

NORMAL ECG

WHEN COMPARED WITH ECG OF 22-JAN-2018 09:14,

NO SIGNIFICANT CHANGE WAS FOUND

Confirmed by Anand Jara MD ([id number]) on 2/25/2020 9:40:03 AM

 

Referred By:             Confirmed By:Anand Jara MD

## 2022-04-15 PROBLEM — Z00.00 ENCOUNTER FOR PREVENTIVE HEALTH EXAMINATION: Status: ACTIVE | Noted: 2022-04-15

## 2022-04-18 ENCOUNTER — APPOINTMENT (OUTPATIENT)
Dept: HEART AND VASCULAR | Facility: CLINIC | Age: 72
End: 2022-04-18
Payer: MEDICARE

## 2022-04-18 VITALS
WEIGHT: 133 LBS | BODY MASS INDEX: 25.11 KG/M2 | HEART RATE: 64 BPM | SYSTOLIC BLOOD PRESSURE: 140 MMHG | HEIGHT: 61 IN | DIASTOLIC BLOOD PRESSURE: 76 MMHG

## 2022-04-18 DIAGNOSIS — I10 ESSENTIAL (PRIMARY) HYPERTENSION: ICD-10-CM

## 2022-04-18 PROCEDURE — 99204 OFFICE O/P NEW MOD 45 MIN: CPT

## 2022-04-18 RX ORDER — TELMISARTAN 80 MG/1
80 TABLET ORAL
Qty: 30 | Refills: 0 | Status: ACTIVE | COMMUNITY
Start: 2021-09-30

## 2022-04-18 RX ORDER — HYDROCHLOROTHIAZIDE 12.5 MG/1
12.5 TABLET ORAL
Qty: 15 | Refills: 0 | Status: ACTIVE | COMMUNITY
Start: 2021-09-30

## 2022-04-18 NOTE — DISCUSSION/SUMMARY
[___ Month(s)] : in [unfilled] month(s) [FreeTextEntry1] : 72 y/o F with PMHx of HTN, HLD, Hypothyroidism here for follow up for hypertension\par \par # HTN\par Slightly above goal\par Continue Telmisartan 80, HCTZ 12.5mg\par Can increase Toprol 25->50mg for now\par Monitor at hoome\par \par # HLD\par Continue current regimen

## 2022-04-18 NOTE — HISTORY OF PRESENT ILLNESS
[FreeTextEntry1] : 70 y/o F with PMHx of HTN, HLD, Hypothyroidism here for follow up for hypertension\par Records BPs at home, have be slightly higher lately, was started on Toprol XL\par \par Echo 12/1/2021: EF 55%, Mild MR, Mild TR\par NST 1/8/2022: Normal perfusion, normal EF\par \par Denies any symptoms of chest pain, SOB, lightheadedness\par \par

## 2022-05-16 ENCOUNTER — APPOINTMENT (OUTPATIENT)
Dept: HEART AND VASCULAR | Facility: CLINIC | Age: 72
End: 2022-05-16
Payer: MEDICARE

## 2022-05-16 VITALS
HEART RATE: 64 BPM | BODY MASS INDEX: 25.11 KG/M2 | HEIGHT: 61 IN | SYSTOLIC BLOOD PRESSURE: 140 MMHG | DIASTOLIC BLOOD PRESSURE: 80 MMHG | WEIGHT: 133 LBS

## 2022-05-16 PROCEDURE — 99213 OFFICE O/P EST LOW 20 MIN: CPT

## 2022-05-16 RX ORDER — METOPROLOL SUCCINATE 50 MG/1
50 TABLET, EXTENDED RELEASE ORAL DAILY
Qty: 90 | Refills: 3 | Status: DISCONTINUED | COMMUNITY
Start: 2022-03-25 | End: 2022-05-16

## 2022-05-16 NOTE — HISTORY OF PRESENT ILLNESS
[FreeTextEntry1] : 70 y/o F with PMHx of HTN, HLD, Hypothyroidism here for follow up for hypertension\par Records BPs at home, have be slightly higher lately, was started on Toprol XL\par \par Echo 12/1/2021: EF 55%, Mild MR, Mild TR\par NST 1/8/2022: Normal perfusion, normal EF\par \par Denies any symptoms of chest pain, SOB, lightheadedness\par \par Cardiac Meds; Metoprolol 50mg BID, Telmisartan 80mg, HCTZ 12.5mg, Simvastatin 10mg\par \par Here today for follow up, has been checking BPs at home\par Continue to have some BPs > 140\par Reports occasional dizziness

## 2022-05-16 NOTE — DISCUSSION/SUMMARY
[___ Month(s)] : in [unfilled] month(s) [FreeTextEntry1] : 70 y/o F with PMHx of HTN, HLD, Hypothyroidism here for follow up for hypertension\par \par # HTN\par Slightly above goal\par Continue Telmisartan 80, HCTZ 12.5mg\par Will switch from Toprol XL to Coreg 6.25mg BID\par Monitor at home\par \par # HLD\par Continue current regimen, Simvastatin 10mg daily

## 2024-05-06 ENCOUNTER — RX RENEWAL (OUTPATIENT)
Age: 74
End: 2024-05-06

## 2024-05-06 RX ORDER — CARVEDILOL 6.25 MG/1
6.25 TABLET, FILM COATED ORAL TWICE DAILY
Qty: 180 | Refills: 2 | Status: ACTIVE | COMMUNITY
Start: 2022-05-16 | End: 1900-01-01

## 2024-10-12 ENCOUNTER — HOSPITAL ENCOUNTER (INPATIENT)
Dept: HOSPITAL 74 - JER | Age: 74
LOS: 4 days | Discharge: HOME | DRG: 419 | End: 2024-10-16
Attending: INTERNAL MEDICINE
Payer: COMMERCIAL

## 2024-10-12 VITALS — BODY MASS INDEX: 24.1 KG/M2

## 2024-10-12 DIAGNOSIS — E03.9: ICD-10-CM

## 2024-10-12 DIAGNOSIS — K76.0: ICD-10-CM

## 2024-10-12 DIAGNOSIS — K57.90: ICD-10-CM

## 2024-10-12 DIAGNOSIS — E11.9: ICD-10-CM

## 2024-10-12 DIAGNOSIS — I10: ICD-10-CM

## 2024-10-12 DIAGNOSIS — F41.9: ICD-10-CM

## 2024-10-12 DIAGNOSIS — K80.00: Primary | ICD-10-CM

## 2024-10-12 DIAGNOSIS — E78.5: ICD-10-CM

## 2024-10-12 DIAGNOSIS — K29.50: ICD-10-CM

## 2024-10-12 DIAGNOSIS — K21.9: ICD-10-CM

## 2024-10-12 DIAGNOSIS — Z85.3: ICD-10-CM

## 2024-10-12 DIAGNOSIS — E05.90: ICD-10-CM

## 2024-10-12 LAB
ALBUMIN SERPL-MCNC: 3.9 G/DL (ref 3.4–5)
ALP SERPL-CCNC: 128 U/L (ref 45–117)
ALT SERPL-CCNC: 254 U/L (ref 13–61)
ANION GAP SERPL CALC-SCNC: 7 MMOL/L (ref 4–13)
APPEARANCE UR: CLEAR
AST SERPL-CCNC: 424 U/L (ref 15–37)
BASOPHILS # BLD: 0.2 % (ref 0–2)
BILIRUB SERPL-MCNC: 1.5 MG/DL (ref 0.2–1)
BILIRUB UR STRIP.AUTO-MCNC: NEGATIVE MG/DL
BUN SERPL-MCNC: 17.1 MG/DL (ref 7–18)
CALCIUM SERPL-MCNC: 9.2 MG/DL (ref 8.5–10.1)
CHLORIDE SERPL-SCNC: 110 MMOL/L (ref 98–107)
CO2 SERPL-SCNC: 25 MMOL/L (ref 21–32)
COLOR UR: YELLOW
CREAT SERPL-MCNC: 1.3 MG/DL (ref 0.55–1.3)
DEPRECATED RDW RBC AUTO: 13.2 % (ref 11.6–15.6)
EOSINOPHIL # BLD: 0.1 % (ref 0–4.5)
GLUCOSE SERPL-MCNC: 149 MG/DL (ref 74–106)
HCT VFR BLD CALC: 40.3 % (ref 32.4–45.2)
HGB BLD-MCNC: 13.4 GM/DL (ref 10.7–15.3)
KETONES UR QL STRIP: NEGATIVE
LEUKOCYTE ESTERASE UR QL STRIP.AUTO: NEGATIVE
LYMPHOCYTES # BLD: 7.1 % (ref 8–40)
MCH RBC QN AUTO: 28.9 PG (ref 25.7–33.7)
MCHC RBC AUTO-ENTMCNC: 33.2 G/DL (ref 32–36)
MCV RBC: 86.9 FL (ref 80–96)
MONOCYTES # BLD AUTO: 6.6 % (ref 3.8–10.2)
NEUTROPHILS # BLD: 86 % (ref 42.8–82.8)
NITRITE UR QL STRIP: NEGATIVE
PH UR: 8 [PH] (ref 5–8)
PLATELET # BLD AUTO: 152 10^3/UL (ref 134–434)
PMV BLD: 9.7 FL (ref 7.5–11.1)
POTASSIUM SERPLBLD-SCNC: 4.1 MMOL/L (ref 3.5–5.1)
PROT SERPL-MCNC: 7.4 G/DL (ref 6.4–8.2)
PROT UR QL STRIP: NEGATIVE
PROT UR QL STRIP: NEGATIVE
RBC # BLD AUTO: 4.64 M/MM3 (ref 3.6–5.2)
SODIUM SERPL-SCNC: 142 MMOL/L (ref 136–145)
SP GR UR: 1.06 (ref 1.01–1.03)
UROBILINOGEN UR STRIP-MCNC: 1 MG/DL (ref 0.2–1)
WBC # BLD AUTO: 13.3 K/MM3 (ref 4–10)

## 2024-10-12 RX ADMIN — ONDANSETRON ONE MG: 2 INJECTION INTRAMUSCULAR; INTRAVENOUS at 16:18

## 2024-10-12 RX ADMIN — ACETAMINOPHEN ONE MG: 10 INJECTION, SOLUTION INTRAVENOUS at 03:21

## 2024-10-12 RX ADMIN — POTASSIUM CHLORIDE, DEXTROSE MONOHYDRATE AND SODIUM CHLORIDE SCH MLS/HR: 150; 5; 450 INJECTION, SOLUTION INTRAVENOUS at 13:05

## 2024-10-12 RX ADMIN — FAMOTIDINE ONE MLS/HR: 20 INJECTION, SOLUTION INTRAVENOUS at 04:01

## 2024-10-12 RX ADMIN — CEFTRIAXONE ONE MLS/HR: 500 INJECTION, POWDER, FOR SOLUTION INTRAMUSCULAR; INTRAVENOUS at 11:44

## 2024-10-13 LAB
ALBUMIN SERPL-MCNC: 3.5 G/DL (ref 3.4–5)
ALP SERPL-CCNC: 165 U/L (ref 45–117)
ALT SERPL-CCNC: 629 U/L (ref 13–61)
ANION GAP SERPL CALC-SCNC: 5 MMOL/L (ref 4–13)
AST SERPL-CCNC: 460 U/L (ref 15–37)
BASOPHILS # BLD: 0.4 % (ref 0–2)
BILIRUB CONJ SERPL-MCNC: 2.2 MG/DL (ref 0–0.2)
BILIRUB SERPL-MCNC: 4.4 MG/DL (ref 0.2–1)
BUN SERPL-MCNC: 8.5 MG/DL (ref 7–18)
CALCIUM SERPL-MCNC: 8.5 MG/DL (ref 8.5–10.1)
CHLORIDE SERPL-SCNC: 110 MMOL/L (ref 98–107)
CO2 SERPL-SCNC: 24 MMOL/L (ref 21–32)
CREAT SERPL-MCNC: 1.2 MG/DL (ref 0.55–1.3)
DEPRECATED RDW RBC AUTO: 13.5 % (ref 11.6–15.6)
EOSINOPHIL # BLD: 3.4 % (ref 0–4.5)
GLUCOSE SERPL-MCNC: 137 MG/DL (ref 74–106)
HCT VFR BLD CALC: 37.5 % (ref 32.4–45.2)
HGB BLD-MCNC: 12.7 GM/DL (ref 10.7–15.3)
INR BLD: 1.11 (ref 0.83–1.09)
LYMPHOCYTES # BLD: 24.2 % (ref 8–40)
MCH RBC QN AUTO: 29.8 PG (ref 25.7–33.7)
MCHC RBC AUTO-ENTMCNC: 33.8 G/DL (ref 32–36)
MCV RBC: 88.2 FL (ref 80–96)
MONOCYTES # BLD AUTO: 8.2 % (ref 3.8–10.2)
NEUTROPHILS # BLD: 63.8 % (ref 42.8–82.8)
PLATELET # BLD AUTO: 138 10^3/UL (ref 134–434)
PMV BLD: 10.5 FL (ref 7.5–11.1)
POTASSIUM SERPLBLD-SCNC: 4.1 MMOL/L (ref 3.5–5.1)
PROT SERPL-MCNC: 6.9 G/DL (ref 6.4–8.2)
PT PNL PPP: 12.5 SEC (ref 9.7–13)
RBC # BLD AUTO: 4.25 M/MM3 (ref 3.6–5.2)
SODIUM SERPL-SCNC: 139 MMOL/L (ref 136–145)
WBC # BLD AUTO: 5.2 K/MM3 (ref 4–10)

## 2024-10-13 RX ADMIN — LEVOTHYROXINE SODIUM SCH MCG: 50 TABLET ORAL at 15:09

## 2024-10-13 RX ADMIN — AMLODIPINE BESYLATE SCH MG: 2.5 TABLET ORAL at 13:55

## 2024-10-13 RX ADMIN — CEFTRIAXONE SCH MLS/HR: 1 INJECTION, POWDER, FOR SOLUTION INTRAMUSCULAR; INTRAVENOUS at 10:21

## 2024-10-14 LAB
ALBUMIN SERPL-MCNC: 3.4 G/DL (ref 3.4–5)
ALP SERPL-CCNC: 149 U/L (ref 45–117)
ALT SERPL-CCNC: 422 U/L (ref 13–61)
ANION GAP SERPL CALC-SCNC: 6 MMOL/L (ref 4–13)
AST SERPL-CCNC: 176 U/L (ref 15–37)
BASOPHILS # BLD: 0.4 % (ref 0–2)
BILIRUB SERPL-MCNC: 1.2 MG/DL (ref 0.2–1)
BUN SERPL-MCNC: 6.7 MG/DL (ref 7–18)
CALCIUM SERPL-MCNC: 8.5 MG/DL (ref 8.5–10.1)
CHLORIDE SERPL-SCNC: 114 MMOL/L (ref 98–107)
CO2 SERPL-SCNC: 22 MMOL/L (ref 21–32)
CREAT SERPL-MCNC: 1 MG/DL (ref 0.55–1.3)
DEPRECATED RDW RBC AUTO: 13.1 % (ref 11.6–15.6)
EOSINOPHIL # BLD: 3.5 % (ref 0–4.5)
GLUCOSE SERPL-MCNC: 104 MG/DL (ref 74–106)
HCT VFR BLD CALC: 38.2 % (ref 32.4–45.2)
HGB BLD-MCNC: 13 GM/DL (ref 10.7–15.3)
INR BLD: 1.05 (ref 0.83–1.09)
LYMPHOCYTES # BLD: 30.7 % (ref 8–40)
MCH RBC QN AUTO: 30.1 PG (ref 25.7–33.7)
MCHC RBC AUTO-ENTMCNC: 34 G/DL (ref 32–36)
MCV RBC: 88.4 FL (ref 80–96)
MONOCYTES # BLD AUTO: 8.7 % (ref 3.8–10.2)
NEUTROPHILS # BLD: 56.7 % (ref 42.8–82.8)
PLATELET # BLD AUTO: 142 10^3/UL (ref 134–434)
PMV BLD: 10.6 FL (ref 7.5–11.1)
POTASSIUM SERPLBLD-SCNC: 4.1 MMOL/L (ref 3.5–5.1)
PROT SERPL-MCNC: 6.8 G/DL (ref 6.4–8.2)
PT PNL PPP: 12.1 SEC (ref 9.7–13)
RBC # BLD AUTO: 4.32 M/MM3 (ref 3.6–5.2)
SODIUM SERPL-SCNC: 142 MMOL/L (ref 136–145)
WBC # BLD AUTO: 5.1 K/MM3 (ref 4–10)

## 2024-10-14 RX ADMIN — DEXTROSE AND SODIUM CHLORIDE SCH MLS/HR: 5; 450 INJECTION, SOLUTION INTRAVENOUS at 22:03

## 2024-10-15 VITALS — RESPIRATION RATE: 18 BRPM

## 2024-10-15 LAB
ALBUMIN SERPL-MCNC: 3.4 G/DL (ref 3.4–5)
ALP SERPL-CCNC: 128 U/L (ref 45–117)
ALT SERPL-CCNC: 288 U/L (ref 13–61)
ANION GAP SERPL CALC-SCNC: 6 MMOL/L (ref 4–13)
AST SERPL-CCNC: 82 U/L (ref 15–37)
BILIRUB SERPL-MCNC: 0.9 MG/DL (ref 0.2–1)
BUN SERPL-MCNC: 7 MG/DL (ref 7–18)
CALCIUM SERPL-MCNC: 8.7 MG/DL (ref 8.5–10.1)
CHLORIDE SERPL-SCNC: 112 MMOL/L (ref 98–107)
CO2 SERPL-SCNC: 23 MMOL/L (ref 21–32)
CREAT SERPL-MCNC: 1 MG/DL (ref 0.55–1.3)
GLUCOSE SERPL-MCNC: 120 MG/DL (ref 74–106)
POTASSIUM SERPLBLD-SCNC: 3.7 MMOL/L (ref 3.5–5.1)
PROT SERPL-MCNC: 6.8 G/DL (ref 6.4–8.2)
SODIUM SERPL-SCNC: 141 MMOL/L (ref 136–145)

## 2024-10-15 PROCEDURE — 0FT44ZZ RESECTION OF GALLBLADDER, PERCUTANEOUS ENDOSCOPIC APPROACH: ICD-10-PCS | Performed by: SURGERY

## 2024-10-15 PROCEDURE — BF03YZZ PLAIN RADIOGRAPHY OF GALLBLADDER AND BILE DUCTS USING OTHER CONTRAST: ICD-10-PCS | Performed by: SURGERY

## 2024-10-15 RX ADMIN — ACETAMINOPHEN SCH MG: 10 INJECTION, SOLUTION INTRAVENOUS at 16:23

## 2024-10-15 RX ADMIN — CEFOXITIN ONE GM: 2 INJECTION, POWDER, FOR SOLUTION INTRAVENOUS at 00:00

## 2024-10-15 RX ADMIN — BUPIVACAINE HYDROCHLORIDE ONE ML: 2.5 INJECTION, SOLUTION EPIDURAL; INFILTRATION; INTRACAUDAL; PERINEURAL at 14:47

## 2024-10-15 RX ADMIN — SODIUM CHLORIDE, POTASSIUM CHLORIDE, SODIUM LACTATE AND CALCIUM CHLORIDE SCH MLS/HR: 600; 310; 30; 20 INJECTION, SOLUTION INTRAVENOUS at 16:35

## 2024-10-15 RX ADMIN — ESCITALOPRAM OXALATE SCH: 10 TABLET, FILM COATED ORAL at 09:10

## 2024-10-15 RX ADMIN — BUPIVACAINE HYDROCHLORIDE ONE ML: 2.5 INJECTION, SOLUTION EPIDURAL; INFILTRATION; INTRACAUDAL; PERINEURAL at 00:00

## 2024-10-15 RX ADMIN — SODIUM CHLORIDE, POTASSIUM CHLORIDE, SODIUM LACTATE AND CALCIUM CHLORIDE SCH MLS/HR: 600; 310; 30; 20 INJECTION, SOLUTION INTRAVENOUS at 16:24

## 2024-10-16 VITALS — DIASTOLIC BLOOD PRESSURE: 52 MMHG | TEMPERATURE: 99.3 F | SYSTOLIC BLOOD PRESSURE: 112 MMHG | HEART RATE: 72 BPM

## 2024-10-16 LAB
ALBUMIN SERPL-MCNC: 3 G/DL (ref 3.4–5)
ALP SERPL-CCNC: 104 U/L (ref 45–117)
ALT SERPL-CCNC: 232 U/L (ref 13–61)
ANION GAP SERPL CALC-SCNC: 7 MMOL/L (ref 4–13)
AST SERPL-CCNC: 111 U/L (ref 15–37)
BASOPHILS # BLD: 0.4 % (ref 0–2)
BILIRUB CONJ SERPL-MCNC: 0.3 MG/DL (ref 0–0.2)
BILIRUB SERPL-MCNC: 0.8 MG/DL (ref 0.2–1)
BUN SERPL-MCNC: 10.5 MG/DL (ref 7–18)
CALCIUM SERPL-MCNC: 8.5 MG/DL (ref 8.5–10.1)
CHLORIDE SERPL-SCNC: 111 MMOL/L (ref 98–107)
CO2 SERPL-SCNC: 24 MMOL/L (ref 21–32)
CREAT SERPL-MCNC: 0.9 MG/DL (ref 0.55–1.3)
DEPRECATED RDW RBC AUTO: 13.3 % (ref 11.6–15.6)
EOSINOPHIL # BLD: 0.1 % (ref 0–4.5)
GLUCOSE SERPL-MCNC: 109 MG/DL (ref 74–106)
HCT VFR BLD CALC: 33.4 % (ref 32.4–45.2)
HGB BLD-MCNC: 11.3 GM/DL (ref 10.7–15.3)
LYMPHOCYTES # BLD: 13.9 % (ref 8–40)
MCH RBC QN AUTO: 29.8 PG (ref 25.7–33.7)
MCHC RBC AUTO-ENTMCNC: 33.9 G/DL (ref 32–36)
MCV RBC: 87.9 FL (ref 80–96)
MONOCYTES # BLD AUTO: 5.6 % (ref 3.8–10.2)
NEUTROPHILS # BLD: 80 % (ref 42.8–82.8)
PLATELET # BLD AUTO: 132 10^3/UL (ref 134–434)
PMV BLD: 10.5 FL (ref 7.5–11.1)
POTASSIUM SERPLBLD-SCNC: 3.8 MMOL/L (ref 3.5–5.1)
PROT SERPL-MCNC: 6 G/DL (ref 6.4–8.2)
RBC # BLD AUTO: 3.8 M/MM3 (ref 3.6–5.2)
SODIUM SERPL-SCNC: 141 MMOL/L (ref 136–145)
WBC # BLD AUTO: 9.5 K/MM3 (ref 4–10)

## 2024-10-16 RX ADMIN — POLYETHYLENE GLYCOL 3350 SCH GM: 17 POWDER, FOR SOLUTION ORAL at 10:36

## 2024-10-16 RX ADMIN — AMLODIPINE BESYLATE SCH: 2.5 TABLET ORAL at 10:36

## 2024-10-16 RX ADMIN — LEVOTHYROXINE SODIUM SCH MCG: 50 TABLET ORAL at 06:21

## 2024-10-16 RX ADMIN — ESCITALOPRAM OXALATE SCH MG: 10 TABLET, FILM COATED ORAL at 10:36

## 2024-10-16 RX ADMIN — CEFTRIAXONE SCH MLS/HR: 1 INJECTION, POWDER, FOR SOLUTION INTRAMUSCULAR; INTRAVENOUS at 10:35

## 2024-11-20 ENCOUNTER — HOSPITAL ENCOUNTER (INPATIENT)
Dept: HOSPITAL 74 - JER | Age: 74
LOS: 2 days | Discharge: HOME | DRG: 379 | End: 2024-11-22
Attending: HOSPITALIST | Admitting: STUDENT IN AN ORGANIZED HEALTH CARE EDUCATION/TRAINING PROGRAM
Payer: COMMERCIAL

## 2024-11-20 VITALS — BODY MASS INDEX: 23 KG/M2

## 2024-11-20 DIAGNOSIS — K57.91: Primary | ICD-10-CM

## 2024-11-20 DIAGNOSIS — K64.9: ICD-10-CM

## 2024-11-20 DIAGNOSIS — K44.9: ICD-10-CM

## 2024-11-20 DIAGNOSIS — E03.9: ICD-10-CM

## 2024-11-20 DIAGNOSIS — K62.5: ICD-10-CM

## 2024-11-20 DIAGNOSIS — K25.3: ICD-10-CM

## 2024-11-20 DIAGNOSIS — E78.5: ICD-10-CM

## 2024-11-20 DIAGNOSIS — F41.8: ICD-10-CM

## 2024-11-20 DIAGNOSIS — K21.9: ICD-10-CM

## 2024-11-20 DIAGNOSIS — E11.9: ICD-10-CM

## 2024-11-20 DIAGNOSIS — I10: ICD-10-CM

## 2024-11-20 LAB
ALBUMIN SERPL-MCNC: 4 G/DL (ref 3.4–5)
ALP SERPL-CCNC: 72 U/L (ref 45–117)
ALT SERPL-CCNC: 24 U/L (ref 13–61)
ANION GAP SERPL CALC-SCNC: 6 MMOL/L (ref 4–13)
APPEARANCE UR: CLEAR
APTT BLD: 31.2 SECONDS (ref 25.2–36.5)
AST SERPL-CCNC: 16 U/L (ref 15–37)
BASOPHILS # BLD: 0.4 % (ref 0–2)
BILIRUB SERPL-MCNC: 1.1 MG/DL (ref 0.2–1)
BILIRUB UR STRIP.AUTO-MCNC: NEGATIVE MG/DL
BUN SERPL-MCNC: 11.6 MG/DL (ref 7–18)
CALCIUM SERPL-MCNC: 9.1 MG/DL (ref 8.5–10.1)
CHLORIDE SERPL-SCNC: 111 MMOL/L (ref 98–107)
CO2 SERPL-SCNC: 25 MMOL/L (ref 21–32)
COLOR UR: YELLOW
CREAT SERPL-MCNC: 1.1 MG/DL (ref 0.55–1.3)
DEPRECATED RDW RBC AUTO: 13.2 % (ref 11.6–15.6)
EOSINOPHIL # BLD: 1 % (ref 0–4.5)
GLUCOSE SERPL-MCNC: 90 MG/DL (ref 74–106)
HCT VFR BLD CALC: 40.2 % (ref 32.4–45.2)
HGB BLD-MCNC: 13.5 GM/DL (ref 10.7–15.3)
INR BLD: 1.03 (ref 0.83–1.09)
KETONES UR QL STRIP: NEGATIVE
LEUKOCYTE ESTERASE UR QL STRIP.AUTO: NEGATIVE
LYMPHOCYTES # BLD: 16.8 % (ref 8–40)
MCH RBC QN AUTO: 29.3 PG (ref 25.7–33.7)
MCHC RBC AUTO-ENTMCNC: 33.7 G/DL (ref 32–36)
MCV RBC: 87 FL (ref 80–96)
MONOCYTES # BLD AUTO: 5.3 % (ref 3.8–10.2)
NEUTROPHILS # BLD: 76.5 % (ref 42.8–82.8)
NITRITE UR QL STRIP: NEGATIVE
PH UR: 6 [PH] (ref 5–8)
PLATELET # BLD AUTO: 170 10^3/UL (ref 134–434)
PMV BLD: 10.1 FL (ref 7.5–11.1)
POTASSIUM SERPLBLD-SCNC: 4 MMOL/L (ref 3.5–5.1)
PROT SERPL-MCNC: 7.6 G/DL (ref 6.4–8.2)
PROT UR QL STRIP: NEGATIVE
PROT UR QL STRIP: NEGATIVE
PT PNL PPP: 11.8 SEC (ref 9.7–13)
RBC # BLD AUTO: 4.62 M/MM3 (ref 3.6–5.2)
SODIUM SERPL-SCNC: 143 MMOL/L (ref 136–145)
SP GR UR: 1.01 (ref 1.01–1.03)
UROBILINOGEN UR STRIP-MCNC: 0.2 MG/DL (ref 0.2–1)
WBC # BLD AUTO: 7.3 K/MM3 (ref 4–10)

## 2024-11-21 LAB
ANION GAP SERPL CALC-SCNC: 9 MMOL/L (ref 4–13)
BASOPHILS # BLD: 0.7 % (ref 0–2)
BUN SERPL-MCNC: 10.8 MG/DL (ref 7–18)
CALCIUM SERPL-MCNC: 9.1 MG/DL (ref 8.5–10.1)
CHLORIDE SERPL-SCNC: 107 MMOL/L (ref 98–107)
CO2 SERPL-SCNC: 24 MMOL/L (ref 21–32)
CREAT SERPL-MCNC: 1.1 MG/DL (ref 0.55–1.3)
DEPRECATED RDW RBC AUTO: 13 % (ref 11.6–15.6)
EOSINOPHIL # BLD: 3.2 % (ref 0–4.5)
GLUCOSE SERPL-MCNC: 96 MG/DL (ref 74–106)
HCT VFR BLD CALC: 38.3 % (ref 32.4–45.2)
HGB BLD-MCNC: 12.6 GM/DL (ref 10.7–15.3)
HIV 1+2 AB+HIV1 P24 AG SERPL QL IA: NEGATIVE
INR BLD: 1.08 (ref 0.83–1.09)
LYMPHOCYTES # BLD: 31 % (ref 8–40)
MCH RBC QN AUTO: 28.8 PG (ref 25.7–33.7)
MCHC RBC AUTO-ENTMCNC: 32.9 G/DL (ref 32–36)
MCV RBC: 87.5 FL (ref 80–96)
MONOCYTES # BLD AUTO: 9 % (ref 3.8–10.2)
NEUTROPHILS # BLD: 56.1 % (ref 42.8–82.8)
PLATELET # BLD AUTO: 157 10^3/UL (ref 134–434)
PMV BLD: 10.6 FL (ref 7.5–11.1)
POTASSIUM SERPLBLD-SCNC: 3.7 MMOL/L (ref 3.5–5.1)
PT PNL PPP: 12.2 SEC (ref 9.7–13)
RBC # BLD AUTO: 4.38 M/MM3 (ref 3.6–5.2)
SODIUM SERPL-SCNC: 140 MMOL/L (ref 136–145)
WBC # BLD AUTO: 5.4 K/MM3 (ref 4–10)

## 2024-11-21 RX ADMIN — BISACODYL ONE MG: 5 TABLET, COATED ORAL at 16:32

## 2024-11-21 RX ADMIN — POLYETHYLENE GLYCOL 3350, SODIUM CHLORIDE, POTASSIUM CHLORIDE, SODIUM BICARBONATE, AND SODIUM SULFATE ONE ML: 240; 5.84; 2.98; 6.72; 22.72 POWDER, FOR SOLUTION ORAL at 18:11

## 2024-11-22 VITALS — HEART RATE: 73 BPM | RESPIRATION RATE: 18 BRPM

## 2024-11-22 VITALS — DIASTOLIC BLOOD PRESSURE: 54 MMHG | TEMPERATURE: 97.9 F | SYSTOLIC BLOOD PRESSURE: 112 MMHG

## 2024-11-22 LAB
ANION GAP SERPL CALC-SCNC: 12 MMOL/L (ref 4–13)
BUN SERPL-MCNC: 10.6 MG/DL (ref 7–18)
CALCIUM SERPL-MCNC: 9.5 MG/DL (ref 8.5–10.1)
CHLORIDE SERPL-SCNC: 108 MMOL/L (ref 98–107)
CO2 SERPL-SCNC: 24 MMOL/L (ref 21–32)
CREAT SERPL-MCNC: 1.1 MG/DL (ref 0.55–1.3)
DEPRECATED RDW RBC AUTO: 13.3 % (ref 11.6–15.6)
GLUCOSE SERPL-MCNC: 77 MG/DL (ref 74–106)
HCT VFR BLD CALC: 40.8 % (ref 32.4–45.2)
HGB BLD-MCNC: 13.9 GM/DL (ref 10.7–15.3)
MAGNESIUM SERPL-MCNC: 2.4 MG/DL (ref 1.8–2.4)
MCH RBC QN AUTO: 29.6 PG (ref 25.7–33.7)
MCHC RBC AUTO-ENTMCNC: 34.1 G/DL (ref 32–36)
MCV RBC: 86.8 FL (ref 80–96)
PHOSPHATE SERPL-MCNC: 3.5 MG/DL (ref 2.5–4.9)
PLATELET # BLD AUTO: 147 10^3/UL (ref 134–434)
PMV BLD: 10.8 FL (ref 7.5–11.1)
POTASSIUM SERPLBLD-SCNC: 3.6 MMOL/L (ref 3.5–5.1)
RBC # BLD AUTO: 4.7 M/MM3 (ref 3.6–5.2)
SODIUM SERPL-SCNC: 144 MMOL/L (ref 136–145)
WBC # BLD AUTO: 6.8 K/MM3 (ref 4–10)

## 2024-11-22 PROCEDURE — 0DBE8ZX EXCISION OF LARGE INTESTINE, VIA NATURAL OR ARTIFICIAL OPENING ENDOSCOPIC, DIAGNOSTIC: ICD-10-PCS | Performed by: INTERNAL MEDICINE

## 2024-11-22 PROCEDURE — 0DB68ZX EXCISION OF STOMACH, VIA NATURAL OR ARTIFICIAL OPENING ENDOSCOPIC, DIAGNOSTIC: ICD-10-PCS | Performed by: INTERNAL MEDICINE

## 2024-11-22 RX ADMIN — AMLODIPINE BESYLATE SCH: 2.5 TABLET ORAL at 16:21

## 2024-11-22 RX ADMIN — ESCITALOPRAM OXALATE SCH: 10 TABLET, FILM COATED ORAL at 16:20

## 2025-07-31 ENCOUNTER — OFFICE (OUTPATIENT)
Dept: URBAN - METROPOLITAN AREA CLINIC 86 | Facility: CLINIC | Age: 75
Setting detail: OPHTHALMOLOGY
End: 2025-07-31
Payer: MEDICARE

## 2025-07-31 DIAGNOSIS — H35.033: ICD-10-CM

## 2025-07-31 DIAGNOSIS — E11.9: ICD-10-CM

## 2025-07-31 DIAGNOSIS — H25.13: ICD-10-CM

## 2025-07-31 DIAGNOSIS — H52.4: ICD-10-CM

## 2025-07-31 DIAGNOSIS — H17.9: ICD-10-CM

## 2025-07-31 PROBLEM — H52.03 HYPEROPIA; BOTH EYES: Status: ACTIVE | Noted: 2025-07-31

## 2025-07-31 PROCEDURE — 92202 OPSCPY EXTND ON/MAC DRAW: CPT | Performed by: OPHTHALMOLOGY

## 2025-07-31 PROCEDURE — 92015 DETERMINE REFRACTIVE STATE: CPT | Performed by: OPHTHALMOLOGY

## 2025-07-31 PROCEDURE — 92004 COMPRE OPH EXAM NEW PT 1/>: CPT | Performed by: OPHTHALMOLOGY

## 2025-07-31 ASSESSMENT — REFRACTION_CURRENTRX
OS_OVR_VA: 20/
OD_OVR_VA: 20/
OD_ADD: +2.50
OS_CYLINDER: +0.75
OS_AXIS: 155
OS_ADD: +2.50
OD_SPHERE: +1.75
OS_SPHERE: +1.25

## 2025-07-31 ASSESSMENT — REFRACTION_MANIFEST
OS_AXIS: 173
OS_CYLINDER: +1.00
OS_ADD: +2.50
OS_VA1: 20/30-
OS_SPHERE: +0.50
OD_AXIS: 020
OD_SPHERE: +1.50
OD_ADD: +2.50
OD_VA1: 20/20-2
OD_CYLINDER: +0.50

## 2025-07-31 ASSESSMENT — CONFRONTATIONAL VISUAL FIELD TEST (CVF)
OS_FINDINGS: FULL
OD_FINDINGS: FULL

## 2025-07-31 ASSESSMENT — VISUAL ACUITY
OS_BCVA: 20/30-2
OD_BCVA: 20/40-2

## 2025-07-31 ASSESSMENT — TONOMETRY
OS_IOP_MMHG: 15
OD_IOP_MMHG: 17